# Patient Record
Sex: MALE | Race: WHITE | URBAN - METROPOLITAN AREA
[De-identification: names, ages, dates, MRNs, and addresses within clinical notes are randomized per-mention and may not be internally consistent; named-entity substitution may affect disease eponyms.]

---

## 2020-09-17 ENCOUNTER — OFFICE VISIT (OUTPATIENT)
Dept: PRIMARY CARE CLINIC | Age: 25
End: 2020-09-17

## 2020-09-17 PROCEDURE — 99211 OFF/OP EST MAY X REQ PHY/QHP: CPT | Performed by: NURSE PRACTITIONER

## 2020-09-17 NOTE — PROGRESS NOTES
Kathy Schwartz received a viral test for COVID-19. They were educated on isolation and quarantine as appropriate. For any symptoms, they were directed to seek care from their PCP, given contact information to establish with a doctor, directed to an urgent care or the emergency room.

## 2020-09-18 ENCOUNTER — TELEPHONE (OUTPATIENT)
Dept: ADMINISTRATIVE | Age: 25
End: 2020-09-18

## 2020-09-18 LAB — SARS-COV-2, NAA: DETECTED

## 2020-09-18 NOTE — TELEPHONE ENCOUNTER
Pt made aware that the COVID-19 test result was positive       Treatment of coronavirus does not require an antibiotic       Remain isolated for 10 days minimum or 72 hours after your symptoms have completely resolved, whichever is longer.       Wash hands often with soap and water for at least 20 seconds or alternatively use hand  with at least 60% alcohol content       Cover coughs and sneezes       Wear a mask when around others if possible       Clean all \"high-touch\" surfaces every day, such as doorknobs and cellphones       Continually monitor symptoms. Contact a medical provider if symptoms are worsening, such as difficulty breathing.       Your local health department will reach out to you and instruct you on your return to work and the general public.       Anyone that lives in the home, or had contact with you, should be in quarantine for 14 days, even with a negative COVID-19 test.           For additional information, please visit the Centers for Disease Control and Prevention at  [facility]..

## 2021-01-08 ENCOUNTER — OFFICE VISIT (OUTPATIENT)
Dept: PRIMARY CARE CLINIC | Age: 26
End: 2021-01-08

## 2021-01-08 DIAGNOSIS — Z20.822 CLOSE EXPOSURE TO COVID-19 VIRUS: Primary | ICD-10-CM

## 2021-01-08 DIAGNOSIS — Z20.822 SUSPECTED COVID-19 VIRUS INFECTION: ICD-10-CM

## 2021-01-08 PROCEDURE — 99211 OFF/OP EST MAY X REQ PHY/QHP: CPT | Performed by: NURSE PRACTITIONER

## 2021-01-08 NOTE — PROGRESS NOTES
Sourav Mcwilliams received a viral test for COVID-19. They were educated on isolation and quarantine as appropriate. For any symptoms, they were directed to seek care from their PCP, given contact information to establish with a doctor, directed to an urgent care or the emergency room.

## 2021-01-09 LAB — SARS-COV-2, PCR: NOT DETECTED
